# Patient Record
(demographics unavailable — no encounter records)

---

## 2025-04-20 NOTE — PHYSICAL EXAM
[No Acute Distress] : no acute distress [Well Developed] : well developed [Well-Appearing] : well-appearing [EOMI] : extraocular movements intact [Normal Sclera/Conjunctiva] : normal sclera/conjunctiva [Supple] : supple [No Respiratory Distress] : no respiratory distress  [No Accessory Muscle Use] : no accessory muscle use [Clear to Auscultation] : lungs were clear to auscultation bilaterally [Normal Rate] : normal rate  [Normal S1, S2] : normal S1 and S2 [Regular Rhythm] : with a regular rhythm [No Focal Deficits] : no focal deficits [Normal Affect] : the affect was normal [Normal Insight/Judgement] : insight and judgment were intact [de-identified] : papular round skin lesion on left side of face/ temple region

## 2025-04-20 NOTE — HISTORY OF PRESENT ILLNESS
[de-identified] : 59 y/o male presents today c/o skin lesion. Pt reports abnormal skin growth on left side of his head noticed 2 weeks ago. He denies itchiness and bleeding.  [FreeTextEntry1] : skin lesion

## 2025-04-20 NOTE — PLAN
[FreeTextEntry1] :  #Skin lesion- derm consult #DM2- check A1C, lipid panel, TSH w. rFT4, continue glimepiride and metformin #HTN- continue amlodipine and lisinopril  F/u for DM2 follow up  Pt will return for labs

## 2025-04-20 NOTE — PHYSICAL EXAM
[No Acute Distress] : no acute distress [Well Developed] : well developed [Well-Appearing] : well-appearing [EOMI] : extraocular movements intact [Normal Sclera/Conjunctiva] : normal sclera/conjunctiva [Supple] : supple [No Respiratory Distress] : no respiratory distress  [No Accessory Muscle Use] : no accessory muscle use [Clear to Auscultation] : lungs were clear to auscultation bilaterally [Normal Rate] : normal rate  [Normal S1, S2] : normal S1 and S2 [Regular Rhythm] : with a regular rhythm [No Focal Deficits] : no focal deficits [Normal Affect] : the affect was normal [Normal Insight/Judgement] : insight and judgment were intact [de-identified] : papular round skin lesion on left side of face/ temple region

## 2025-04-20 NOTE — HISTORY OF PRESENT ILLNESS
[de-identified] : 59 y/o male presents today c/o skin lesion. Pt reports abnormal skin growth on left side of his head noticed 2 weeks ago. He denies itchiness and bleeding.  [FreeTextEntry1] : skin lesion

## 2025-07-15 NOTE — PHYSICAL EXAM
[No Acute Distress] : no acute distress [Well Developed] : well developed [Well-Appearing] : well-appearing [Normal Sclera/Conjunctiva] : normal sclera/conjunctiva [EOMI] : extraocular movements intact [Supple] : supple [No Respiratory Distress] : no respiratory distress  [No Accessory Muscle Use] : no accessory muscle use [Clear to Auscultation] : lungs were clear to auscultation bilaterally [Normal Rate] : normal rate  [Regular Rhythm] : with a regular rhythm [Normal S1, S2] : normal S1 and S2 [No Focal Deficits] : no focal deficits [Normal Affect] : the affect was normal [Normal Insight/Judgement] : insight and judgment were intact [de-identified] : papular round skin lesion on left side of face/ temple region [Soft] : abdomen soft [Non Tender] : non-tender [Non-distended] : non-distended

## 2025-07-15 NOTE — HISTORY OF PRESENT ILLNESS
[FreeTextEntry1] : f/u DM2 [de-identified] : 59 y/o male presents for follow up of DM2. He denies any acute complaints. Skin lesion consistent with seborrheic keratosis on dermatopathology report.

## 2025-07-15 NOTE — PLAN
[FreeTextEntry1] :   #DM2- check A1C- 7.1%, near goal, CBC w. diff, CMP, vitamin b12 level, continue glimepiride and metformin BID, annual eye and foot exam, low sugar/carb diet #Elevated LFTs- CMP, HIV, Hep B, Hep C #HLD- lipid panel, on simvastatin  #HTN- continue amlodipine and lisinopril #Hypothyroidism- TFTs, continue levothyroxine   F/u for DM2 follow up  Pt will return for labs

## 2025-07-15 NOTE — PHYSICAL EXAM
[No Acute Distress] : no acute distress [Well Developed] : well developed [Well-Appearing] : well-appearing [Normal Sclera/Conjunctiva] : normal sclera/conjunctiva [EOMI] : extraocular movements intact [Supple] : supple [No Respiratory Distress] : no respiratory distress  [No Accessory Muscle Use] : no accessory muscle use [Clear to Auscultation] : lungs were clear to auscultation bilaterally [Normal Rate] : normal rate  [Regular Rhythm] : with a regular rhythm [Normal S1, S2] : normal S1 and S2 [No Focal Deficits] : no focal deficits [Normal Affect] : the affect was normal [Normal Insight/Judgement] : insight and judgment were intact [de-identified] : papular round skin lesion on left side of face/ temple region [Soft] : abdomen soft [Non Tender] : non-tender [Non-distended] : non-distended

## 2025-07-15 NOTE — HISTORY OF PRESENT ILLNESS
[FreeTextEntry1] : f/u DM2 [de-identified] : 59 y/o male presents for follow up of DM2. He denies any acute complaints. Skin lesion consistent with seborrheic keratosis on dermatopathology report.  no...